# Patient Record
Sex: FEMALE | Race: WHITE | URBAN - METROPOLITAN AREA
[De-identification: names, ages, dates, MRNs, and addresses within clinical notes are randomized per-mention and may not be internally consistent; named-entity substitution may affect disease eponyms.]

---

## 2024-10-16 ENCOUNTER — OFFICE VISIT (OUTPATIENT)
Age: 44
End: 2024-10-16

## 2024-10-16 VITALS
HEART RATE: 112 BPM | SYSTOLIC BLOOD PRESSURE: 128 MMHG | WEIGHT: 268 LBS | BODY MASS INDEX: 38.37 KG/M2 | TEMPERATURE: 98.9 F | OXYGEN SATURATION: 98 % | DIASTOLIC BLOOD PRESSURE: 80 MMHG | HEIGHT: 70 IN

## 2024-10-16 DIAGNOSIS — K59.00 CONSTIPATION, UNSPECIFIED CONSTIPATION TYPE: Primary | ICD-10-CM

## 2024-10-16 NOTE — PATIENT INSTRUCTIONS
Discharge medications reviewed with the patient and appropriate educational materials and side effects teaching were provided.     Increase fluids (preferably with electrolytes) and rest.  Emergency follow up required for symptoms including, but not limited to, shortness of breath, chest pain, mental status change, fevers >101, difficulty or inability to swallow, dehydration, or if symptoms worsen.  See printed instructions given at discharge.

## 2024-10-16 NOTE — PROGRESS NOTES
Tarah Casrto (:  1980) is a 44 y.o. female,New patient, here for evaluation of the following chief complaint(s):  Abdominal Pain (Generalized abd pain, no BM x3 days, nausea )    Assessment & Plan :         Subjective :  Abdominal Pain (Generalized abd pain, no BM x3 days, nausea )  States has just taken stool softeners and no laxatives.      History provided by:  Patient       44 y.o. female presents with symptoms of constipation         Vitals:    10/16/24 1643 10/16/24 1701   BP: (!) 129/92 128/80   Site: Right Upper Arm Right Lower Arm   Position: Sitting Sitting   Cuff Size: Medium Adult    Pulse: (!) 112    Temp: 98.9 °F (37.2 °C)    TempSrc: Oral    SpO2: 98%    Weight: 121.6 kg (268 lb)    Height: 1.778 m (5' 10\")        No results found for this visit on 10/16/24.      Objective   Physical Exam  Constitutional:       Appearance: Normal appearance.   HENT:      Mouth/Throat:      Lips: Pink.   Eyes:      General: Lids are normal.   Cardiovascular:      Rate and Rhythm: Normal rate.   Pulmonary:      Effort: Pulmonary effort is normal.   Abdominal:      General: Bowel sounds are decreased. There is distension.      Palpations: Abdomen is soft.   Skin:     General: Skin is warm and dry.   Psychiatric:         Behavior: Behavior is cooperative.              An electronic signature was used to authenticate this note.     SMOOTH Youssef - CNP